# Patient Record
Sex: MALE | Race: BLACK OR AFRICAN AMERICAN | NOT HISPANIC OR LATINO | URBAN - NONMETROPOLITAN AREA
[De-identification: names, ages, dates, MRNs, and addresses within clinical notes are randomized per-mention and may not be internally consistent; named-entity substitution may affect disease eponyms.]

---

## 2018-07-18 NOTE — PATIENT DISCUSSION
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 NO EVIDENCE OF OPTIC NEUROPATHY AT THIS TIME.  FOLLOW

## 2018-07-18 NOTE — PATIENT DISCUSSION
MODERATE DRY EYES: PRESCRIBED PRESERVATIVE FREE ARTIFICIAL TEARS 4-6X A DAY, OU AND THE DAILY INTAKE OF OMEGA-3 DHA/EPA FATTY ACIDS TO HELP RELIEVE SYMPTOMS. ADD NIGHTLY LUBRICATING OINTMENT OR GEL. RETURN FOR FOLLOW-UP AS SCHEDULED OR SOONER IF SYMPTOMS WORSEN.

## 2018-08-09 NOTE — PATIENT DISCUSSION
MACULAR RPE CHANGES, OD: MILD AND ASYMPTOMATIC TODAY. DISCUSSED POSSIBLE CAUSES INCLUDING WHITE DOT SYNDROME, EPITHELIITIS (Katrina Olvera), OR CENTRAL SEROUS CHORIORETINOPATHY. NO ACTIVE EXUDATION OR RPE DETACHMENTS. RETURN TO DR Hola Acosta AS NEEDED.

## 2019-01-15 NOTE — PATIENT DISCUSSION
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  STABLE VF AND OCT RNFL DISCUSSED WITH PT. FOLLOW

## 2019-04-09 ENCOUNTER — IMPORTED ENCOUNTER (OUTPATIENT)
Dept: URBAN - NONMETROPOLITAN AREA CLINIC 1 | Facility: CLINIC | Age: 64
End: 2019-04-09

## 2019-04-09 PROBLEM — H43.813: Noted: 2019-04-09

## 2019-04-09 PROBLEM — H52.4: Noted: 2019-04-09

## 2019-04-09 PROBLEM — E11.9: Noted: 2019-04-09

## 2019-04-09 PROCEDURE — 99204 OFFICE O/P NEW MOD 45 MIN: CPT

## 2019-04-09 NOTE — PATIENT DISCUSSION
DM s DR-Stressed the importance of keeping blood sugars under control blood pressure under control and weight normalization and regular visits with PCP. -Explained the possible effects of poorly controlled diabetes and the damage that diabetes can cause to ocular health. -Patient to check HgbA1C.-Pt instructed to contact our office with any vision changes.

## 2019-12-09 ENCOUNTER — IMPORTED ENCOUNTER (OUTPATIENT)
Dept: URBAN - NONMETROPOLITAN AREA CLINIC 1 | Facility: CLINIC | Age: 64
End: 2019-12-09

## 2019-12-09 PROBLEM — H40.11X4: Noted: 2020-01-13

## 2019-12-09 PROBLEM — H43.813: Noted: 2019-12-09

## 2019-12-09 PROBLEM — E11.9: Noted: 2019-12-09

## 2019-12-09 PROBLEM — H40.11X3: Noted: 2021-01-21

## 2019-12-09 PROBLEM — H40.023: Noted: 2019-12-09

## 2019-12-09 PROBLEM — H40.11X3: Noted: 2020-12-20

## 2019-12-09 PROBLEM — H25.13: Noted: 2019-12-09

## 2019-12-09 PROBLEM — H52.4: Noted: 2019-12-09

## 2019-12-09 PROCEDURE — 99213 OFFICE O/P EST LOW 20 MIN: CPT

## 2019-12-09 NOTE — PATIENT DISCUSSION
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

## 2020-01-13 ENCOUNTER — IMPORTED ENCOUNTER (OUTPATIENT)
Dept: URBAN - NONMETROPOLITAN AREA CLINIC 1 | Facility: CLINIC | Age: 65
End: 2020-01-13

## 2020-01-13 PROCEDURE — 92133 CPTRZD OPH DX IMG PST SGM ON: CPT

## 2020-01-13 PROCEDURE — 99213 OFFICE O/P EST LOW 20 MIN: CPT

## 2020-01-13 NOTE — PATIENT DISCUSSION
COAG OU indeterminate stage-  discussed findings w/patient -  IOPs much better at 13 OU-  start Latanoprost QHS OU Rx sent-  OCT ONH done today 1/13/2020 OD: 8/10 SS 60 um severe thinning sup/temp/inf normal RNFL nasal OS: 5/10 SS 55um severe thinning sup/in normal RNFL nasal/temp-  RTC 2 mo f/u w/Gonio and Pach; get 24-2 VF before next apptCataracts OU -  discussed findings w/patient-  visually significant with decreased vision -  will continue glaucoma testing before referral-  RTC 2 mo f/uType II DM w/o Retinopathy OU (Dx 1999)-  discussed findings w/patient-  patient is currently taking medication -  BS running 200-300.-  no retinopathy noted at this time-  reviewed the importance of good blood sugar control and the negative effects of poorly controlled sugars on ocular health -  monitor as scheduled or prn; 's Notes: Tmax 27 27MR 12/9/2019DFE 12/9/2019OCT ON 1/13/2020

## 2020-03-09 ENCOUNTER — IMPORTED ENCOUNTER (OUTPATIENT)
Dept: URBAN - NONMETROPOLITAN AREA CLINIC 1 | Facility: CLINIC | Age: 65
End: 2020-03-09

## 2020-03-09 PROCEDURE — 92083 EXTENDED VISUAL FIELD XM: CPT

## 2020-03-09 NOTE — PATIENT DISCUSSION
Testing [id number] VFOD: R complete superior arcuate scotoma inferior nasal stepOS: R complete superior/inferior arcuate scotoma small island of central vision remaining; 's Notes: Tmax 27 27MR 12/9/2019DFE 12/9/2019OCT ON 1/13/2020

## 2020-06-20 ENCOUNTER — IMPORTED ENCOUNTER (OUTPATIENT)
Dept: URBAN - NONMETROPOLITAN AREA CLINIC 1 | Facility: CLINIC | Age: 65
End: 2020-06-20

## 2020-06-20 PROCEDURE — 92020 GONIOSCOPY: CPT

## 2020-06-20 PROCEDURE — 99213 OFFICE O/P EST LOW 20 MIN: CPT

## 2020-06-20 PROCEDURE — 76514 ECHO EXAM OF EYE THICKNESS: CPT

## 2020-06-20 NOTE — PATIENT DISCUSSION
COAG OU indeterminate stage-  discussed findings w/patient -  IOPs much better at 13 OU-  continue Latanoprost QHS OU-  24-2 VF 3/9/2020 Baseline (patient has not had test before)    OD: R complete superior arcuate scotoma inferior nasal step    OS: R complete superior/inferior arcuate scotoma small island of central vision remaining     Patient will need repeat testing done before we are able to determine glaucoma staging-  OCT ONH done 1/13/2020 OD: 8/10 SS 61 um severe thinning sup/temp/inf normal RNFL nasal OS: 5/10 SS 55um severe thinning sup/in normal RNFL nasal/temp-  Pach done today 6/20/2020490 496-  Gonio done today 6/20/2020 Grade 3 w/heavy pigment -  RTC n/a repeat 24-2 VF then f/u w/NLCataracts OU -  discussed findings w/patient-  visually significant with decreased vision -  will continue glaucoma testing before referral-  need to repeat 24-2 VF before we can refer for cat eval Type II DM w/o Retinopathy OU (Dx 1999)-  discussed findings w/patient-  patient is currently taking medication -  BS running 200-300.-  no retinopathy noted at this time-  reviewed the importance of good blood sugar control and the negative effects of poorly controlled sugars on ocular health -  monitor as scheduled or prn; 's Notes: Tmax 27 27MR 12/9/2019DFE 12/9/2019OCT ON 1/13/2020

## 2020-07-27 ENCOUNTER — IMPORTED ENCOUNTER (OUTPATIENT)
Dept: URBAN - NONMETROPOLITAN AREA CLINIC 1 | Facility: CLINIC | Age: 65
End: 2020-07-27

## 2020-07-27 PROCEDURE — 99213 OFFICE O/P EST LOW 20 MIN: CPT

## 2020-07-27 NOTE — PATIENT DISCUSSION
MARY OU-  discussed findings w/patient -  IOPs stable at 13 OU-  continue Latanoprost QHS OU-  24-2 VF 3/9/2020 Baseline (patient has not had test before)    OD: R complete superior arcuate scotoma inferior nasal step    OS: R complete superior/inferior arcuate scotoma small island of central vision remaining     Patient will need repeat testing done before we are able to determine glaucoma staging-  OCT ONH done 1/13/2020 OD: 8/10 SS 61 um severe thinning sup/temp/inf normal RNFL nasal OS: 5/10 SS 55um severe thinning sup/in normal RNFL nasal/temp-  Pach done 6/20/2020 490 496-  Gonio done  6/20/2020 Grade 3 w/heavy pigment -  RTC 3 mo Complete w/24-2 VFCataracts OU -  discussed findings w/patient-  visually significant with decreased vision -  will continue glaucoma testing before referral-  need to repeat 24-2 VF before we can refer for cat eval Type II DM w/o Retinopathy OU (Dx 1999)-  discussed findings w/patient-  patient is currently taking medication -  BS running better than previous-  no retinopathy noted at this time-  reviewed the importance of good blood sugar control and the negative effects of poorly controlled sugars on ocular health -  monitor at 3 mo Complete or prn; 's Notes: Tmax 27 27MR 12/9/2019DFE 12/9/201924-2 VF 3/9/2020OCT ON 1/13/2020

## 2020-10-06 ENCOUNTER — IMPORTED ENCOUNTER (OUTPATIENT)
Dept: URBAN - NONMETROPOLITAN AREA CLINIC 1 | Facility: CLINIC | Age: 65
End: 2020-10-06

## 2020-10-06 PROCEDURE — 92083 EXTENDED VISUAL FIELD XM: CPT

## 2020-10-06 NOTE — PATIENT DISCUSSION
COAG OU indeterminate stage-  discussed findings w/patient -  IOPs stable at 13 OU-  continue Latanoprost QHS OU-  24-2 VF 3/9/2020 Baseline (patient has not had test before)    OD: R complete superior arcuate scotoma inferior nasal step    OS: R complete superior/inferior arcuate scotoma small island of central vision remaining     Patient will need repeat testing done before we are able to determine glaucoma staging-  OCT ONH done 1/13/2020 OD: 8/10 SS 61 um severe thinning sup/temp/inf normal RNFL nasal OS: 5/10 SS 55um severe thinning sup/in normal RNFL nasal/temp-  Pach done 6/20/2020 490 496-  Gonio done  6/20/2020 Grade 3 w/heavy pigment -  RTC 3 mo Complete w/24-2 VFCataracts OU -  discussed findings w/patient-  visually significant with decreased vision -  will continue glaucoma testing before referral-  need to repeat 24-2 VF before we can refer for cat eval Type II DM w/o Retinopathy OU (Dx 1999)-  discussed findings w/patient-  patient is currently taking medication -  BS running better than previous-  no retinopathy noted at this time-  reviewed the importance of good blood sugar control and the negative effects of poorly controlled sugars on ocular health -  monitor at 3 mo Complete or prn; 's Notes: Tmax 27 27MR 12/9/2019DFE 12/9/201924-2 VF 3/9/2020OCT ON 1/13/2020

## 2020-10-19 ENCOUNTER — IMPORTED ENCOUNTER (OUTPATIENT)
Dept: URBAN - NONMETROPOLITAN AREA CLINIC 1 | Facility: CLINIC | Age: 65
End: 2020-10-19

## 2020-10-19 PROCEDURE — 99213 OFFICE O/P EST LOW 20 MIN: CPT

## 2020-10-19 NOTE — PATIENT DISCUSSION
COAG OU severe stage-  discussed findings w/patient -  IOPs stable at 13 OD and 14 OS -  Continue Latanoprost QHS OU- Reviewed VF in detail w/ patient:    OD: Germán superior loss OD     OS: Diffuse VF constriction OS -  RTC in 4 months w/ dilated exam and OCT ONCataracts OU -  discussed findings w/patient-  visually significant with decreased vision -  will continue glaucoma testing before referralType II DM w/o Retinopathy OU (Dx 1999)-  discussed findings w/patient-  patient is currently taking medication -  BS running better than previous-  no retinopathy noted at this time-  reviewed the importance of good blood sugar control and the negative effects of poorly controlled sugars on ocular health; 's Notes: Tmax 27 27MR 12/9/2019DFE 12/9/201924-2 VF 3/9/2020OCT ON 1/13/2020

## 2021-03-08 ENCOUNTER — IMPORTED ENCOUNTER (OUTPATIENT)
Dept: URBAN - NONMETROPOLITAN AREA CLINIC 1 | Facility: CLINIC | Age: 66
End: 2021-03-08

## 2021-03-08 PROBLEM — H25.13: Noted: 2019-12-09

## 2021-03-08 PROBLEM — E11.9: Noted: 2019-12-09

## 2021-03-08 PROBLEM — H40.1133: Noted: 2021-03-08

## 2021-03-08 PROBLEM — H43.813: Noted: 2019-12-09

## 2021-03-08 PROCEDURE — 99213 OFFICE O/P EST LOW 20 MIN: CPT

## 2021-03-08 PROCEDURE — 92133 CPTRZD OPH DX IMG PST SGM ON: CPT

## 2021-08-09 ENCOUNTER — IMPORTED ENCOUNTER (OUTPATIENT)
Dept: URBAN - NONMETROPOLITAN AREA CLINIC 1 | Facility: CLINIC | Age: 66
End: 2021-08-09

## 2021-08-09 PROCEDURE — 92012 INTRM OPH EXAM EST PATIENT: CPT

## 2021-08-09 NOTE — PATIENT DISCUSSION
COAG OU severe stage-  discussed findings w/patient -  IOPs 15 OU -  Loss of va in OS-  Continue Latanoprost QHS OUCataracts OU -  discussed findings w/patient-  visually significant with decreased vision -  will continue glaucoma testing before referralType II DM w/o Retinopathy OU (Dx 1999)-  discussed findings w/patient-  no retinpathy at this time -  reviewed the importance of good blood sugar control and the negative effects of poorly controlled sugars on ocular health RTC  4 month IOP check with VF 10-2 OS 24-2 OD; 's Notes: Tmax 27 27MR 12/9/2019DFE 12/9/201924-2 VF 3/9/2020OCT ON 1/13/2020

## 2022-01-27 ENCOUNTER — FOLLOW UP (OUTPATIENT)
Dept: RURAL CLINIC 1 | Facility: CLINIC | Age: 67
End: 2022-01-27

## 2022-01-27 DIAGNOSIS — H25.813: ICD-10-CM

## 2022-01-27 DIAGNOSIS — H40.1133: ICD-10-CM

## 2022-01-27 PROCEDURE — 92083 EXTENDED VISUAL FIELD XM: CPT

## 2022-01-27 PROCEDURE — 92012 INTRM OPH EXAM EST PATIENT: CPT

## 2022-01-27 ASSESSMENT — VISUAL ACUITY
OD_SC: 20/25
OS_SC: 20/400

## 2022-01-27 ASSESSMENT — TONOMETRY
OD_IOP_MMHG: 14
OS_IOP_MMHG: 15

## 2022-04-15 ASSESSMENT — VISUAL ACUITY
OS_CC: 20/70-1
OS_CC: 20/70
OD_CC: 20/25
OS_CC: 20/70-1
OU_SC: 20/30-1
OS_CC: 20/100-1
OD_CC: 20/25-2
OD_CC: 20/30
OS_CC: J1
OS_SC: 20/100
OD_CC: 20/30
OD_CC: J1
OS_CC: 20/70-2
OD_SC: 20/25-2
OD_CC: 20/30
OD_CC: 20/40-
OD_CC: 20/25
OS_PH: 20/60+2
OS_CC: 20/25
OS_CC: 20/30
OU_CC: 20/30
OS_CC: 20/400
OD_CC: 20/30-1
OU_CC: 20/30

## 2022-04-15 ASSESSMENT — PACHYMETRY
OD_CT_UM: 490; ADJ: VTHIN
OS_CT_UM: 496; ADJ: VTHIN
OS_CT_UM: 496; ADJ: VTHIN
OD_CT_UM: 490; ADJ: VTHIN
OS_CT_UM: 496; ADJ: VTHIN
OD_CT_UM: 490; ADJ: VTHIN
OD_CT_UM: 490; ADJ: VTHIN
OS_CT_UM: 496; ADJ: VTHIN

## 2022-04-15 ASSESSMENT — TONOMETRY
OS_IOP_MMHG: 13
OS_IOP_MMHG: 13
OD_IOP_MMHG: 12
OD_IOP_MMHG: 15
OD_IOP_MMHG: 14
OS_IOP_MMHG: 15
OS_IOP_MMHG: 13
OD_IOP_MMHG: 13
OD_IOP_MMHG: 13
OS_IOP_MMHG: 14
OD_IOP_MMHG: 27
OD_IOP_MMHG: 13
OS_IOP_MMHG: 15
OS_IOP_MMHG: 27

## 2022-05-19 ENCOUNTER — FOLLOW UP (OUTPATIENT)
Dept: RURAL CLINIC 1 | Facility: CLINIC | Age: 67
End: 2022-05-19

## 2022-05-19 DIAGNOSIS — H40.1133: ICD-10-CM

## 2022-05-19 PROCEDURE — 92015 DETERMINE REFRACTIVE STATE: CPT

## 2022-05-19 PROCEDURE — 92014 COMPRE OPH EXAM EST PT 1/>: CPT

## 2022-05-19 ASSESSMENT — VISUAL ACUITY
OS_PH: 20/100
OD_PH: 20/30
OD_SC: 20/30-1
OU_SC: 20/70
OU_SC: 20/30-1
OS_SC: 20/200 BLURRY

## 2022-05-19 ASSESSMENT — TONOMETRY
OS_IOP_MMHG: 17
OD_IOP_MMHG: 17

## 2022-11-28 ENCOUNTER — FOLLOW UP (OUTPATIENT)
Dept: RURAL CLINIC 1 | Facility: CLINIC | Age: 67
End: 2022-11-28

## 2022-11-28 DIAGNOSIS — H40.1133: ICD-10-CM

## 2022-11-28 PROCEDURE — 92133 CPTRZD OPH DX IMG PST SGM ON: CPT

## 2022-11-28 PROCEDURE — 92012 INTRM OPH EXAM EST PATIENT: CPT

## 2022-11-28 RX ORDER — DORZOLAMIDE HYDROCHLORIDE TIMOLOL MALEATE 20; 5 MG/ML; MG/ML
1 SOLUTION/ DROPS OPHTHALMIC TWICE A DAY
Start: 2022-11-28

## 2022-11-28 ASSESSMENT — VISUAL ACUITY
OU_SC: 20/70
OD_SC: 20/30
OU_SC: 20/30

## 2022-11-28 ASSESSMENT — TONOMETRY
OS_IOP_MMHG: 19
OD_IOP_MMHG: 17

## 2023-06-05 ENCOUNTER — FOLLOW UP (OUTPATIENT)
Dept: RURAL CLINIC 1 | Facility: CLINIC | Age: 68
End: 2023-06-05

## 2023-06-05 DIAGNOSIS — H40.1133: ICD-10-CM

## 2023-06-05 DIAGNOSIS — E11.9: ICD-10-CM

## 2023-06-05 PROCEDURE — 92133 CPTRZD OPH DX IMG PST SGM ON: CPT

## 2023-06-05 PROCEDURE — 92012 INTRM OPH EXAM EST PATIENT: CPT

## 2023-06-05 ASSESSMENT — TONOMETRY
OD_IOP_MMHG: 9
OS_IOP_MMHG: 10

## 2023-06-05 ASSESSMENT — VISUAL ACUITY
OD_SC: 20/30
OU_SC: 20/30

## 2023-09-14 ENCOUNTER — ESTABLISHED PATIENT (OUTPATIENT)
Dept: RURAL CLINIC 1 | Facility: CLINIC | Age: 68
End: 2023-09-14

## 2023-09-14 DIAGNOSIS — H52.12: ICD-10-CM

## 2023-09-14 DIAGNOSIS — H25.813: ICD-10-CM

## 2023-09-14 DIAGNOSIS — H52.4: ICD-10-CM

## 2023-09-14 DIAGNOSIS — H40.1133: ICD-10-CM

## 2023-09-14 DIAGNOSIS — E11.9: ICD-10-CM

## 2023-09-14 DIAGNOSIS — H52.223: ICD-10-CM

## 2023-09-14 DIAGNOSIS — H52.01: ICD-10-CM

## 2023-09-14 PROCEDURE — 92083 EXTENDED VISUAL FIELD XM: CPT

## 2023-09-14 PROCEDURE — 99214 OFFICE O/P EST MOD 30 MIN: CPT

## 2023-09-14 PROCEDURE — 92015 DETERMINE REFRACTIVE STATE: CPT

## 2023-09-14 ASSESSMENT — VISUAL ACUITY
OU_SC: 20/40-2
OD_SC: 20/40-1

## 2023-09-14 ASSESSMENT — TONOMETRY
OS_IOP_MMHG: 9
OD_IOP_MMHG: 12

## 2023-12-18 ENCOUNTER — FOLLOW UP (OUTPATIENT)
Dept: RURAL CLINIC 1 | Facility: CLINIC | Age: 68
End: 2023-12-18

## 2023-12-18 DIAGNOSIS — H40.1133: ICD-10-CM

## 2023-12-18 PROCEDURE — 92133 CPTRZD OPH DX IMG PST SGM ON: CPT

## 2023-12-18 PROCEDURE — 99213 OFFICE O/P EST LOW 20 MIN: CPT

## 2023-12-18 ASSESSMENT — TONOMETRY
OD_IOP_MMHG: 13
OS_IOP_MMHG: 14

## 2023-12-18 ASSESSMENT — VISUAL ACUITY
OU_SC: 20/40
OD_SC: 20/40-2

## 2024-03-25 ENCOUNTER — FOLLOW UP (OUTPATIENT)
Dept: RURAL CLINIC 1 | Facility: CLINIC | Age: 69
End: 2024-03-25

## 2024-03-25 DIAGNOSIS — H25.813: ICD-10-CM

## 2024-03-25 DIAGNOSIS — H40.1133: ICD-10-CM

## 2024-03-25 DIAGNOSIS — H43.813: ICD-10-CM

## 2024-03-25 DIAGNOSIS — E11.9: ICD-10-CM

## 2024-03-25 PROCEDURE — 92020 GONIOSCOPY: CPT

## 2024-03-25 PROCEDURE — 92014 COMPRE OPH EXAM EST PT 1/>: CPT

## 2024-03-25 ASSESSMENT — VISUAL ACUITY
OS_SC: CF 2FT
OU_CC: 20/30
OU_SC: 20/40-1
OD_SC: 20/40-1
OD_CC: 20/30
OS_CC: 20/30

## 2024-03-25 ASSESSMENT — TONOMETRY
OD_IOP_MMHG: 14
OS_IOP_MMHG: 14

## 2024-06-24 ENCOUNTER — FOLLOW UP (OUTPATIENT)
Dept: RURAL CLINIC 1 | Facility: CLINIC | Age: 69
End: 2024-06-24

## 2024-06-24 DIAGNOSIS — H43.813: ICD-10-CM

## 2024-06-24 DIAGNOSIS — E11.9: ICD-10-CM

## 2024-06-24 DIAGNOSIS — H40.1133: ICD-10-CM

## 2024-06-24 DIAGNOSIS — H25.813: ICD-10-CM

## 2024-06-24 PROCEDURE — 92083 EXTENDED VISUAL FIELD XM: CPT

## 2024-06-24 PROCEDURE — 92012 INTRM OPH EXAM EST PATIENT: CPT

## 2024-06-24 ASSESSMENT — TONOMETRY
OS_IOP_MMHG: 17
OD_IOP_MMHG: 17
OD_IOP_MMHG: 15
OS_IOP_MMHG: 13

## 2024-06-24 ASSESSMENT — VISUAL ACUITY
OS_SC: 20/400 BLURRY
OD_SC: 20/40-2

## 2024-10-28 ENCOUNTER — COMPREHENSIVE EXAM (OUTPATIENT)
Dept: RURAL CLINIC 1 | Facility: CLINIC | Age: 69
End: 2024-10-28

## 2025-01-13 ENCOUNTER — FOLLOW UP (OUTPATIENT)
Age: 70
End: 2025-01-13

## 2025-01-13 DIAGNOSIS — H02.882: ICD-10-CM

## 2025-01-13 DIAGNOSIS — H40.1133: ICD-10-CM

## 2025-01-13 DIAGNOSIS — H02.885: ICD-10-CM

## 2025-01-13 PROCEDURE — 99213 OFFICE O/P EST LOW 20 MIN: CPT

## 2025-05-20 ENCOUNTER — FOLLOW UP (OUTPATIENT)
Age: 70
End: 2025-05-20

## 2025-05-20 DIAGNOSIS — H02.885: ICD-10-CM

## 2025-05-20 DIAGNOSIS — H40.1133: ICD-10-CM

## 2025-05-20 DIAGNOSIS — H25.813: ICD-10-CM

## 2025-05-20 DIAGNOSIS — H02.882: ICD-10-CM

## 2025-05-20 PROCEDURE — 92133 CPTRZD OPH DX IMG PST SGM ON: CPT

## 2025-05-20 PROCEDURE — 99214 OFFICE O/P EST MOD 30 MIN: CPT
